# Patient Record
Sex: MALE | Race: BLACK OR AFRICAN AMERICAN | NOT HISPANIC OR LATINO | Employment: FULL TIME | URBAN - METROPOLITAN AREA
[De-identification: names, ages, dates, MRNs, and addresses within clinical notes are randomized per-mention and may not be internally consistent; named-entity substitution may affect disease eponyms.]

---

## 2022-07-24 ENCOUNTER — HOSPITAL ENCOUNTER (EMERGENCY)
Facility: HOSPITAL | Age: 37
Discharge: HOME | End: 2022-07-24
Attending: EMERGENCY MEDICINE | Admitting: EMERGENCY MEDICINE

## 2022-07-24 ENCOUNTER — APPOINTMENT (OUTPATIENT)
Dept: GENERAL RADIOLOGY | Facility: HOSPITAL | Age: 37
End: 2022-07-24

## 2022-07-24 VITALS
WEIGHT: 195.55 LBS | BODY MASS INDEX: 28.96 KG/M2 | RESPIRATION RATE: 18 BRPM | HEART RATE: 57 BPM | OXYGEN SATURATION: 100 % | DIASTOLIC BLOOD PRESSURE: 73 MMHG | HEIGHT: 69 IN | SYSTOLIC BLOOD PRESSURE: 144 MMHG | TEMPERATURE: 98.4 F

## 2022-07-24 DIAGNOSIS — S67.22XA CRUSH INJURY OF HAND, LEFT, INITIAL ENCOUNTER: Primary | ICD-10-CM

## 2022-07-24 PROCEDURE — 99283 EMERGENCY DEPT VISIT LOW MDM: CPT

## 2022-07-24 PROCEDURE — 73130 X-RAY EXAM OF HAND: CPT

## 2022-07-24 RX ORDER — ACETAMINOPHEN 325 MG/1
975 TABLET ORAL ONCE
Status: COMPLETED | OUTPATIENT
Start: 2022-07-24 | End: 2022-07-24

## 2022-07-24 RX ORDER — IBUPROFEN 600 MG/1
600 TABLET ORAL ONCE
Status: COMPLETED | OUTPATIENT
Start: 2022-07-24 | End: 2022-07-24

## 2022-07-24 RX ORDER — IBUPROFEN 800 MG/1
800 TABLET ORAL EVERY 6 HOURS PRN
Qty: 30 TABLET | Refills: 0 | Status: SHIPPED | OUTPATIENT
Start: 2022-07-24

## 2022-07-24 RX ADMIN — ACETAMINOPHEN 975 MG: 325 TABLET ORAL at 01:23

## 2022-07-24 RX ADMIN — IBUPROFEN 600 MG: 600 TABLET, FILM COATED ORAL at 01:23

## 2022-07-24 NOTE — DISCHARGE INSTRUCTIONS
Rest, ice, and elevate.  Wear the Ace wrap for comfort and swelling.  Use the sling for elevation.  Take your meds as prescribed.  You may also take over-the-counter acetaminophen with your medications as needed for pain.  Call work well Monday morning to follow-up with them for further evaluation and treatment.  Return to the emergency department for any acutely worsening swelling, development of redness or any inability to flex or extend your fingers or hand, or any new or worse concerns.

## 2022-07-24 NOTE — ED PROVIDER NOTES
Subjective   The patient presents to the emergency department complaining of left hand pain and swelling.  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  He states that he is right-hand dominant.  He has no open wounds but does have diffuse swelling over the hand part of his left hand.  He is able to flex and extend all of his fingers and touch his thumb with all of his fingers and has full flexion.  He denies any wrist pain or finger pain.  He is able to flex and extend his wrist without any difficulties.  He is neurovascular intact.          Review of Systems   Constitutional: Negative for chills and fever.   HENT: Negative for congestion, ear pain and sore throat.    Eyes: Negative for pain.   Respiratory: Negative for cough, chest tightness and shortness of breath.    Cardiovascular: Negative for chest pain.   Gastrointestinal: Negative for abdominal pain, diarrhea, nausea and vomiting.   Genitourinary: Negative for flank pain and hematuria.   Musculoskeletal: Negative for joint swelling, neck pain and neck stiffness.        LEFT HAND PAIN/SWELLING   Skin: Negative for pallor and rash.   Neurological: Negative for seizures and headaches.   All other systems reviewed and are negative.      History reviewed. No pertinent past medical history.    No Known Allergies    History reviewed. No pertinent surgical history.    History reviewed. No pertinent family history.    Social History     Socioeconomic History   • Marital status:    Tobacco Use   • Smoking status: Never Smoker   Substance and Sexual Activity   • Alcohol use: Never           Objective   Physical Exam  Vitals and nursing note reviewed.   Constitutional:       General: He is not in acute distress.     Appearance: Normal appearance. He is not ill-appearing or toxic-appearing.   HENT:      Head: Normocephalic and atraumatic.   Eyes:      General: No scleral  icterus.  Cardiovascular:      Rate and Rhythm: Normal rate and regular rhythm.      Pulses: Normal pulses.   Pulmonary:      Effort: Pulmonary effort is normal. No respiratory distress.   Abdominal:      General: Abdomen is flat.   Musculoskeletal:         General: Swelling, tenderness and signs of injury present. No deformity. Normal range of motion.      Cervical back: Normal range of motion and neck supple.   Skin:     General: Skin is warm and dry.      Capillary Refill: Capillary refill takes less than 2 seconds.   Neurological:      General: No focal deficit present.      Mental Status: He is alert and oriented to person, place, and time. Mental status is at baseline.   Psychiatric:         Mood and Affect: Mood normal.         Behavior: Behavior normal.         Procedures           ED Course                                           MDM  Number of Diagnoses or Management Options  Crush injury of hand, left, initial encounter: new and requires workup     Amount and/or Complexity of Data Reviewed  Tests in the radiology section of CPT®: reviewed    Risk of Complications, Morbidity, and/or Mortality  Presenting problems: low  Diagnostic procedures: low  Management options: low    Patient Progress  Patient progress: stable      Final diagnoses:   Crush injury of hand, left, initial encounter       ED Disposition  ED Disposition     ED Disposition   Discharge    Condition   Stable    Comment   --             [facility] MD [address] [address] [phone]  Call   MONDAY, FOR FOLLOW UP         Medication List      New Prescriptions    ibuprofen 800 MG tablet  Commonly known as: ADVIL,MOTRIN  Take 1 tablet by mouth Every 6 (Six) Hours As Needed for Mild Pain  or Moderate Pain .           Where to Get Your Medications      These medications were sent to [id number] - [place], [place] - [address] AT [facility] - [phone]  -  [phone] FX  [address] [place] [address]    Hours: 24-hours Phone: [phone]   · ibuprofen 800 MG tablet          Rita Moreno, ELOY  07/24/22 [id number]

## 2024-05-28 ENCOUNTER — TRANSCRIBE ORDERS (OUTPATIENT)
Dept: PHYSICAL THERAPY | Facility: CLINIC | Age: 39
End: 2024-05-28
Payer: COMMERCIAL

## 2024-05-28 DIAGNOSIS — S46.911A STRAIN OF RIGHT SHOULDER, INITIAL ENCOUNTER: Primary | ICD-10-CM

## 2024-05-30 ENCOUNTER — TREATMENT (OUTPATIENT)
Dept: PHYSICAL THERAPY | Facility: CLINIC | Age: 39
End: 2024-05-30
Payer: OTHER MISCELLANEOUS

## 2024-05-30 DIAGNOSIS — S46.211D SPRAIN, BICEP, RIGHT, SUBSEQUENT ENCOUNTER: ICD-10-CM

## 2024-05-30 DIAGNOSIS — M25.511 RIGHT SHOULDER PAIN, UNSPECIFIED CHRONICITY: ICD-10-CM

## 2024-05-30 DIAGNOSIS — M25.811 IMPINGEMENT OF RIGHT SHOULDER: Primary | ICD-10-CM

## 2024-05-30 NOTE — PROGRESS NOTES
Physical Therapy Initial Evaluation and Plan of Care                    [facility] [address] [place] [address]    Patient: Cade Brock   : 1985  Diagnosis/ICD-10 Code:  Impingement of right shoulder [M25.811]  Referring practitioner: Carito Rodriguez, *  Date of Initial Visit: 2024  Today's Date: 2024  Patient seen for 1 sessions           Subjective Questionnaire: QuickDASH: 30 (43.18% disability)      Subjective Evaluation    History of Present Illness  Mechanism of injury: Pt reports to therapy with R shoulder pain. He hurt his shoulder in february. 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 Some of these carts can weight up to 1000 pounds. One of the carts was on an incline and started rolling towards him. He reached out to stop the cart from hitting him with his R arm. He felt pain immediately in the R shoulder. Pt reports that he did not really hear or feel a pop, but it was so sudden that he is unsure about that.     Xray of the R shoulder on 24 was unremarkable.     He is unable to sleep on his R side. If he is pushing heavy items, he feels pain in the R shoulder. He feels like he still has strength in his arm. It hurts when he puts pressure on it. If he tries to sleep on his right side, it can wake him up. It feels better if he sleeps on his stomach and lets his R arm hang off the bed. Pt denies any clicking in his shoulder. Pt denies numbness or tingling in the shoulder. Pt is having difficulty reaching out to the side and reaching behind his back because of discomfort. He feels that his ROM in these directions is limited due to pain, not tightness.     When pt performs crossed arm adduction- he feels pain in the top of his shoulder.     Pt tried to ice it at the beginning. He has his wife try to massage it.     Pt reports that his doctor recommended that he does not lift more  "than 25# and that he does not reach overhead. However, his job does not really have a form of \"light duty,\" so he is just staying home right now.     Goals: get his R shoulder back to full function, return to work    PMH: R elbow fx    Pain  Current pain ratin  At best pain ratin  At worst pain ratin  Quality: discomfort, dull ache and sharp  Relieving factors: change in position  Aggravating factors: movement, lifting, overhead activity, prolonged positioning, sleeping and repetitive movement    Hand dominance: right    Treatments  Previous treatment: physical therapy  Patient Goals  Patient goals for therapy: decreased pain, increased motion, return to work, return to sport/leisure activities, increased strength and independence with ADLs/IADLs        Objective          Palpation     Right Tenderness of the posterior deltoid, supraspinatus and upper trapezius.     Tenderness     Right Shoulder  Tenderness in the AC joint, biceps tendon (proximal) and bicipital groove.     Active Range of Motion   Left Shoulder   Flexion: WFL  Abduction: WFL  External rotation 90°: WFL  Internal rotation 90°: WFL    Right Shoulder   Flexion: WFL  Abduction: 140 degrees with pain  External rotation 90°: WFL and with pain  Internal rotation 90°: WFL and with pain    Additional Active Range of Motion Details  Shoulder IR functional AROM  L shoulder: middle of scapulae   R shoulder: low back       Strength/Myotome Testing     Left Shoulder     Planes of Motion   Flexion: 5   Abduction: 5   External rotation at 0°: 5   Internal rotation at 0°: 5     Right Shoulder     Planes of Motion   Flexion: 4+ (pain)   Abduction: 4+ (pain)   External rotation at 0°: 4+   Internal rotation at 0°: 4+ (pain)     Tests     Right Shoulder   Positive AC shear, Hawkin's, Neer's and passive horizontal adduction.       See Exercise, Manual, and Modality Logs for complete treatment.     Assessment & Plan       Assessment  Impairments: abnormal " muscle firing, abnormal muscle tone, abnormal or restricted ROM, activity intolerance, impaired physical strength, lacks appropriate home exercise program, pain with function and safety issue   Functional limitations: carrying objects, lifting, pulling, pushing, reaching behind back, reaching overhead and unable to perform repetitive tasks   Assessment details: Pt presents to therapy s/p injury of the R shoulder that occurred in February. Results of objective testing and subjective information are consistent with R shoulder impingement / biceps tendon strain. Pt has associated shoulder weakness, decreased AROM and other functional deficits (QuickDASH). Skilled therapy is required in order to address the aforementioned impairments so that the pt can return to his job, which requires extensive lifting and reaching.   Prognosis: good    Goals  Plan Goals: SHOULDER  PROBLEMS:     1. The patient has limited ROM of the R shoulder.    LTG 1: 12 weeks:  The patient will demonstrate 180 degrees of R shoulder flexion and functional internal rotation to approx T6, in order to allow the patient to reach into upper kitchen cabinets and manipulate clothing behind the back with greater ease.    STATUS:  New   STG 1a: 6 weeks:  The patient will demonstrate 160 degrees of R shoulder flexion and functional internal rotation to approx T10, in order to allow the patient to reach into upper kitchen cabinets and manipulate clothing behind the back with greater ease.    STATUS:  New    2. The patient has limited strength of the R shoulder.   LTG 2: 12 weeks:  The patient will demonstrate 5 /5 strength for R shoulder flexion, abduction, external rotation, and internal rotation in order to demonstrate improved shoulder stability.    STATUS:  New   STG2a:  6 weeks:  The patient will be independent with home exercises.     STATUS:  New     3. The patient complains of pain to the R shoulder with pressure   LTG 3: 12 weeks:  The patient will  report a pain rating of 1 /10 or better, on average in the past week when lying on his right side, in order to improve sleep quality and tolerance to performance of activities of daily living.    STATUS:  New   STG 3a: 6 weeks:  The patient will report a pain rating of 4 /10 or better, on average in the past week when lying on his right side, in order to improve sleep quality and tolerance to performance of activities of daily living.    STATUS:  New    4. Carrying, Moving, and Handling Objects Functional Limitation     LTG 4: 12 weeks:  The patient will demonstrate 10 % limitation or less on the QuickDASH.    STATUS:  New   STG 4a: 6 weeks:  The patient will demonstrate 30% limitation or less on the QuickDASH.      STATUS:  New     Plan  Therapy options: will be seen for skilled therapy services  Planned modality interventions: cryotherapy, electrical stimulation/Russian stimulation and TENS  Planned therapy interventions: ADL retraining, soft tissue mobilization, strengthening, stretching, therapeutic activities, joint mobilization, home exercise program, functional ROM exercises, flexibility, body mechanics training, postural training, neuromuscular re-education, manual therapy and motor coordination training  Frequency: 3x week  Duration in weeks: 12  Treatment plan discussed with: patient        Visit Diagnoses:    ICD-10-CM ICD-9-CM   1. Impingement of right shoulder  M25.811 719.81   2. Right shoulder pain, unspecified chronicity  M25.511 719.41   3. Sprain, bicep, right, subsequent encounter  S46.211D V58.89     840.8       History # of Personal Factors and/or Comorbidities: LOW (0)  Examination of Body System(s): # of elements: LOW (1-2)  Clinical Presentation: STABLE   Clinical Decision Making: LOW       Timed:         Manual Therapy:    0     mins  90932;     Therapeutic Exercise:    0     mins  99492;     Neuromuscular Raphael:    0    mins  30165;    Therapeutic Activity:     15     mins  04464;     Gait  Trainin     mins  57572;     Ultrasound:     0     mins  42544;    Ionto                               0    mins   10274  Self Care                       0     mins   37608  Canalith Repos    0     mins 42732      Un-Timed:  Electrical Stimulation:    0     mins  25825 ( );  Dry Needling     0     mins self-pay  Traction     0     mins 04923  Low Eval     30     Mins  01276  Mod Eval     0     Mins  56117  High Eval                       0     Mins  41343  Re-Eval                           0    mins  42496    Timed Treatment:   15   mins   Total Treatment:     45   mins    PT SIGNATURE: Chloe Keith PT    Electronically signed 2024    [place] License: PT - [id number]      Initial Certification  Certification Period: 2024 thru 2024  I certify that the therapy services are furnished while this patient is under my care.  The services outlined above are required by this patient, and will be reviewed every 90 days.     PHYSICIAN: Carito Rodriguez APRN  NPI: [id number]      DATE:     Please sign and return via fax to [phone]. Thank you, [facility].

## 2024-06-05 ENCOUNTER — TREATMENT (OUTPATIENT)
Dept: PHYSICAL THERAPY | Facility: CLINIC | Age: 39
End: 2024-06-05
Payer: OTHER MISCELLANEOUS

## 2024-06-05 DIAGNOSIS — M25.511 RIGHT SHOULDER PAIN, UNSPECIFIED CHRONICITY: ICD-10-CM

## 2024-06-05 DIAGNOSIS — S46.211D SPRAIN, BICEP, RIGHT, SUBSEQUENT ENCOUNTER: ICD-10-CM

## 2024-06-05 DIAGNOSIS — M25.811 IMPINGEMENT OF RIGHT SHOULDER: Primary | ICD-10-CM

## 2024-06-05 PROCEDURE — 97530 THERAPEUTIC ACTIVITIES: CPT

## 2024-06-05 PROCEDURE — 97110 THERAPEUTIC EXERCISES: CPT

## 2024-06-05 PROCEDURE — 97140 MANUAL THERAPY 1/> REGIONS: CPT

## 2024-06-05 NOTE — PROGRESS NOTES
Physical Therapy Daily Treatment Note  [facility] [address] [place] [address]    Patient: Cade Brock   : 1985  Referring practitioner: Carito Rodriguez, *  Date of Initial Visit: Type: THERAPY  Noted: 2024  Today's Date: 2024  Patient seen for 2 sessions           Subjective  Cade Brock reports: he is still experiencing the same pain he initially c/o. He commented that he experiences popping the front of R shoulder        Objective   See Exercise, Manual, and Modality Logs for complete treatment.       Assessment/Plan  Reviewed HEP. Today is just the first follow up after IE, with Chip requiring ongoing therapist directed intervention to fully address deficits following R shoulder.    Visit Diagnoses:    ICD-10-CM ICD-9-CM   1. Impingement of right shoulder  M25.811 719.81   2. Right shoulder pain, unspecified chronicity  M25.511 719.41   3. Sprain, bicep, right, subsequent encounter  S46.211D V58.89     840.8       Progress per Plan of Care and Progress strengthening /stabilization /functional activity         Timed:  Manual Therapy:    10     mins  01831;  Therapeutic Exercise:   10      mins  66818;     Neuromuscular Raphael:        mins  83681;    Therapeutic Activity:     10     mins  16715;     Gait Training:           mins  72920;     Ultrasound:          mins  71545;    Electrical Stimulation:         mins  09428 ( );  Aquatics  __   mins   27408    Untimed:  Electrical Stimulation:         mins  03544 ( );  Mechanical Traction:         mins  76534;     Timed Treatment:   30   mins   Total Treatment:     30   mins    Electronically Signed:  Estephanie Cruz PTA  Physical Therapist Assistant    [place] PTA license [id number]

## 2024-06-13 ENCOUNTER — TREATMENT (OUTPATIENT)
Dept: PHYSICAL THERAPY | Facility: CLINIC | Age: 39
End: 2024-06-13
Payer: OTHER MISCELLANEOUS

## 2024-06-13 DIAGNOSIS — M25.811 IMPINGEMENT OF RIGHT SHOULDER: Primary | ICD-10-CM

## 2024-06-13 DIAGNOSIS — M25.511 RIGHT SHOULDER PAIN, UNSPECIFIED CHRONICITY: ICD-10-CM

## 2024-06-13 DIAGNOSIS — S46.211D SPRAIN, BICEP, RIGHT, SUBSEQUENT ENCOUNTER: ICD-10-CM

## 2024-06-13 NOTE — PROGRESS NOTES
"Physical Therapy Daily Treatment Note  [facility] [address] [place] [address]    Patient: Cade Brock   : 1985  Referring practitioner: Cartio Rodriguez, *  Date of Initial Visit: Type: THERAPY  Noted: 2024  Today's Date: 2024  Patient seen for 3 sessions           Subjective  Cade Brock reports: he returned to the doctor at [facility] yesterday. \"He wants me to continue with therapy.\" I get the pain when I put pressure on it or when I do a movement with twisting.\"     Objective   PTA able to feel a pop R shoulder/while palpating bicipital groove during pt performance of shoulder adduction.  Tenderness at R UT, supraspinatus sulcus and biceps.    See Exercise, Manual, and Modality Logs for complete treatment.     Assessment/Plan  Question need for further work up to determine if there is an internal derangement R shoulder. Ongoing care to aid shoulder stability to reduce pain required.     Visit Diagnoses:    ICD-10-CM ICD-9-CM   1. Impingement of right shoulder  M25.811 719.81   2. Right shoulder pain, unspecified chronicity  M25.511 719.41   3. Sprain, bicep, right, subsequent encounter  S46.211D V58.89     840.8       Progress per Plan of Care and Progress strengthening /stabilization /functional activity         Timed:  Manual Therapy:         mins  52272;  Therapeutic Exercise:    8     mins  60987;     Neuromuscular Raphael:   8     mins  38205;    Therapeutic Activity:    15     mins  85957;     Gait Training:           mins  35753;     Ultrasound:          mins  61353;    Electrical Stimulation:         mins  80568 ( );  Aquatics  __   mins   97343    Untimed:  Electrical Stimulation:         mins  90191 ( );  Mechanical Traction:         mins  10050;     Timed Treatment:   31   mins   Total Treatment:    31    mins    Electronically Signed:  Estephanie Cruz PTA  Physical Therapist Assistant    [place] PTA license [id number]            "

## 2024-06-18 ENCOUNTER — TREATMENT (OUTPATIENT)
Dept: PHYSICAL THERAPY | Facility: CLINIC | Age: 39
End: 2024-06-18
Payer: OTHER MISCELLANEOUS

## 2024-06-18 DIAGNOSIS — M25.811 IMPINGEMENT OF RIGHT SHOULDER: Primary | ICD-10-CM

## 2024-06-18 DIAGNOSIS — M25.511 RIGHT SHOULDER PAIN, UNSPECIFIED CHRONICITY: ICD-10-CM

## 2024-06-18 DIAGNOSIS — S46.211D SPRAIN, BICEP, RIGHT, SUBSEQUENT ENCOUNTER: ICD-10-CM

## 2024-06-18 NOTE — PROGRESS NOTES
"Physical Therapy Daily Treatment Note  [place] [facility] [address] [place] [address]    Patient: Cdae Brock   : 1985  Referring practitioner: Carito Rodriguez, *  Date of Initial Visit: Type: THERAPY  Noted: 2024  Today's Date: 2024  Patient seen for 4 sessions           Subjective  Cade Brock reports: no pain at rest. Chip reported he had another pop from his R shoulder. \"They have me appt to see Orthopaedic physician tomorrow.\"    Chip commented he felt fine when leaving session today.    Objective   See Exercise, Manual, and Modality Logs for complete treatment.     Assessment/Plan  Tenderness remains R supraspinatus sulcus, AC, biceps tendon with palpation. Consult with ortho tomorrow. Did change some of his exercises today due to c/o shoulder popping.    Visit Diagnoses:    ICD-10-CM ICD-9-CM   1. Impingement of right shoulder  M25.811 719.81   2. Right shoulder pain, unspecified chronicity  M25.511 719.41   3. Sprain, bicep, right, subsequent encounter  S46.211D V58.89     840.8       Progress per Plan of Care and Progress strengthening /stabilization /functional activity         Timed:  Manual Therapy:         mins  87280;  Therapeutic Exercise:    10     mins  51460;     Neuromuscular Raphael:    10    mins  05477;    Therapeutic Activity:     10     mins  13594;     Gait Training:           mins  28343;     Ultrasound:          mins  48266;    Electrical Stimulation:         mins  71760 ( );  Aquatics  __   mins   27185    Untimed:  Electrical Stimulation:         mins  41141 ( );  Mechanical Traction:         mins  18966;     Timed Treatment:   30   mins   Total Treatment:     30   mins    Electronically Signed:  Estephanie Cruz PTA  Physical Therapist Assistant    [place] PTA license [id number]            "

## 2024-06-19 ENCOUNTER — OFFICE VISIT (OUTPATIENT)
Dept: ORTHOPEDIC SURGERY | Facility: CLINIC | Age: 39
End: 2024-06-19
Payer: OTHER MISCELLANEOUS

## 2024-06-19 VITALS
WEIGHT: 199 LBS | DIASTOLIC BLOOD PRESSURE: 79 MMHG | SYSTOLIC BLOOD PRESSURE: 145 MMHG | OXYGEN SATURATION: 98 % | HEART RATE: 51 BPM | HEIGHT: 69 IN | BODY MASS INDEX: 29.47 KG/M2

## 2024-06-19 DIAGNOSIS — S49.91XA INJURY OF RIGHT SHOULDER, INITIAL ENCOUNTER: Primary | ICD-10-CM

## 2024-06-19 NOTE — PROGRESS NOTES
"Chief Complaint  Pain and Initial Evaluation of the Right Shoulder    Subjective          Cade Brock presents to [facility] for an evaluation of his right shoulder.     History of Present Illness    The patient presents here today for an evaluation  of his right shoulder. He is a  and he was unloading his truck back in February when he felt a pull to his shoulder. He went to [facility] and had x-rays taken. He reports no prior surgery to his shoulder. This is a work comp injury. He has had a prior dislocation to his shoulder when he was a teenager.     No Known Allergies     Social History     Socioeconomic History    Marital status:    Tobacco Use    Smoking status: Never    Smokeless tobacco: Never   Vaping Use    Vaping status: Never Used   Substance and Sexual Activity    Alcohol use: Never        I reviewed the patient's chief complaint, history of present illness, review of systems, past medical history, surgical history, family history, social history, medications, and allergy list.     REVIEW OF SYSTEMS    Constitutional: Denies fevers, chills, weight loss  Cardiovascular: Denies chest pain, shortness of breath  Skin: Denies rashes, acute skin changes  Neurologic: Denies headache, loss of consciousness  MSK: Right shoulder pain       Objective   Vital Signs:   /79   Pulse 51   Ht 175.3 cm (69\")   Wt 90.3 kg (199 lb)   SpO2 98%   BMI 29.39 kg/m²     Body mass index is 29.39 kg/m².    Physical Exam    General: Alert. No acute distress.   Right upper extremity: non tender to the biceps, tender over the subacromial interval, forward elevation  to 160 degrees, external rotation  to 60 degrees, internal rotation to waistline, 3+/5 supraspinatus strength  with pain, 5/5 infraspinatus and 5/5 subscap, positive impingement, pain with speed's, pain with Hartshorn's, mild pain with dynamic shear testing, neurovascularly intact, sensation intact to the " medial, radial and ulnar nerve.       Procedures    Imaging Results (Most Recent)       None                     Assessment and Plan        XR Shoulder 2+ View Right    Result Date: 5/26/2024  Narrative: XR SHOULDER 2+ VW RIGHT-  Date of Exam: 5/26/2024 9:59 AM  Indication: Right shoulder pain, work-related injury on February 27; M25.511-Pain in right shoulder; M75.51-Bursitis of right shoulder  Comparison: None available.  Findings: No acute fracture or dislocation is noted. AC joint and glenohumeral joint appear unremarkable. Limited imaging the chest is unremarkable. Soft tissues appear unremarkable.      Impression: Impression: Negative study   Electronically Signed By-Jagdish Retana On:5/26/2024 10:29 AM        Diagnoses and all orders for this visit:    1. Injury of right shoulder, initial encounter (Primary)  -     MRI Shoulder Right Without Contrast; Future        The patient presents here today for an evaluation of his right shoulder.     MRI order placed today to evaluate for internal derangement.     He will continue over the counter medications for pain control.     Work note provided for the patient today.     Call or return if worsening symptoms.    Scribed for Kenney Brandt MD by Dominique Abrams  06/19/2024   08:57 EDT         Follow Up       After MRI     Patient was given instructions and counseling regarding his condition or for health maintenance advice. Please see specific information pulled into the AVS if appropriate.       I have personally performed the services described in this document as scribed by the above individual and it is both accurate and complete. Kenney Brandt MD 06/19/24 09:13 EDT

## 2024-06-21 ENCOUNTER — TREATMENT (OUTPATIENT)
Dept: PHYSICAL THERAPY | Facility: CLINIC | Age: 39
End: 2024-06-21
Payer: OTHER MISCELLANEOUS

## 2024-06-21 DIAGNOSIS — S46.211D SPRAIN, BICEP, RIGHT, SUBSEQUENT ENCOUNTER: ICD-10-CM

## 2024-06-21 DIAGNOSIS — M25.511 RIGHT SHOULDER PAIN, UNSPECIFIED CHRONICITY: ICD-10-CM

## 2024-06-21 DIAGNOSIS — M25.811 IMPINGEMENT OF RIGHT SHOULDER: Primary | ICD-10-CM

## 2024-06-21 NOTE — PROGRESS NOTES
Physical Therapy Daily Treatment Note                      [facility] [address] [facility] [place] [address]    Patient: Cade Brock   : 1985  Diagnosis/ICD-10 Code:  Impingement of right shoulder [M25.811]  Referring practitioner: Carito Rodriguez, *  Date of Initial Visit: Type: THERAPY  Noted: 2024  Today's Date: 2024  Patient seen for 5 sessions           Subjective   The patient reported that his shoulder is doing okay. He saw the orthopedic surgeon the other day, and they sent in an order for an MRI- currently waiting on approval for this.     Objective   See Exercise, Manual, and Modality Logs for complete treatment.     Assessment/Plan  Pt tolerated today's session well. Pt did have some pain during the return portion of the lat pull down, likely because this position was causing a little bit of impingement. PT thinks that it is a good idea that pt receives and MRI because there could be an impairment of the transverse humeral ligament,  or pt could have a labral tear. Skilled therapy still required in order to continue improving R shoulder strength and pain so that he can return to his PLOF and tolerance to work. Continue POC.        Timed:  Manual Therapy:    0     mins  73754;  Therapeutic Exercise:    23     mins  21906;     Neuromuscular Raphael:   0    mins  90461;    Therapeutic Activity:     8     mins  61899;     Gait Trainin     mins  46458;     Aquatics                         0      mins  03907    Un-timed:  Mechanical Traction      0     mins  00678  Dry Needling     0     mins self-pay  Electrical Stimulation:    0     mins  65140 ( );      Timed Treatment:   31   mins   Total Treatment:     31   mins    Chloe Keith PT    Electronically signed 2024    [place] License: PT - [id number]

## 2024-06-25 ENCOUNTER — TREATMENT (OUTPATIENT)
Dept: PHYSICAL THERAPY | Facility: CLINIC | Age: 39
End: 2024-06-25
Payer: OTHER MISCELLANEOUS

## 2024-06-25 DIAGNOSIS — M25.511 RIGHT SHOULDER PAIN, UNSPECIFIED CHRONICITY: ICD-10-CM

## 2024-06-25 DIAGNOSIS — S46.211D SPRAIN, BICEP, RIGHT, SUBSEQUENT ENCOUNTER: ICD-10-CM

## 2024-06-25 DIAGNOSIS — M25.811 IMPINGEMENT OF RIGHT SHOULDER: Primary | ICD-10-CM

## 2024-06-25 NOTE — PROGRESS NOTES
Physical Therapy Daily Treatment Note                      [place] [facility] [address] [place] [address]    Patient: Cade Brock   : 1985  Diagnosis/ICD-10 Code:  Impingement of right shoulder [M25.811]  Referring practitioner: Carito Rodriguez, *  Date of Initial Visit: Type: THERAPY  Noted: 2024  Today's Date: 2024  Patient seen for 6 sessions           Subjective   The patient reported that his shoulder MRI is scheduled for 24. The patient reported fatigue and mild pain at the end of the session. Overhead activities and ER activities usually exacerbate the pain.     Objective   See Exercise, Manual, and Modality Logs for complete treatment.     Assessment/Plan  Pt tolerated today's session well. Pt was able to perform chest press today, which is an improvement compared to last session. However, pt did report that the shoulder popped several times during lower trap lifts off the wall. PT still thinks that it is a good idea that pt receives and MRI because there could be an impairment of the transverse humeral ligament, or pt could have a labral tear. MRI is scheduled for 24. Skilled therapy still required in order to continue improving R shoulder strength and pain so that he can return to his PLOF and tolerance to work. Continue POC.        Timed:  Manual Therapy:    0     mins  31493;  Therapeutic Exercise:    23     mins  45947;     Neuromuscular Raphael:   0    mins  45751;    Therapeutic Activity:     8     mins  53656;     Gait Trainin     mins  24643;     Aquatics                         0      mins  02504    Un-timed:  Mechanical Traction      0     mins  61739  Dry Needling     0     mins self-pay  Electrical Stimulation:    0     mins  09116 ( );      Timed Treatment:   31   mins   Total Treatment:     31   mins    Chloe Keith PT    Electronically signed 2024    [place] License: PT - [id number]

## 2024-06-26 ENCOUNTER — TREATMENT (OUTPATIENT)
Dept: PHYSICAL THERAPY | Facility: CLINIC | Age: 39
End: 2024-06-26
Payer: OTHER MISCELLANEOUS

## 2024-06-26 DIAGNOSIS — S46.211D SPRAIN, BICEP, RIGHT, SUBSEQUENT ENCOUNTER: ICD-10-CM

## 2024-06-26 DIAGNOSIS — M25.811 IMPINGEMENT OF RIGHT SHOULDER: Primary | ICD-10-CM

## 2024-06-26 DIAGNOSIS — M25.511 RIGHT SHOULDER PAIN, UNSPECIFIED CHRONICITY: ICD-10-CM

## 2024-06-26 NOTE — PROGRESS NOTES
Progress Note  [facility] [address] [place] [address]      Patient: C[place] Brock   : 1985  Diagnosis/ICD-10 Code:  Impingement of right shoulder [M25.811]  Referring practitioner: Carito Rodriguez, *  Date of Initial Visit: Type: THERAPY  Noted: 2024  Today's Date: 2024  Patient seen for 7 sessions      Subjective:   Subjective Questionnaire: QuickDASH: 23 (27.27% disability)  Clinical Progress: improved  Home Program Compliance: Yes  Treatment has included: therapeutic exercise, neuromuscular re-education, manual therapy, and therapeutic activity    Subjective     Pt was hurting after his last therapy session- it was more of a burning pain.     Pt reports that he cannot lie on the R shoulder for more than 10 seconds when he tries to sleep at night. He had his R arm on the armrest while he was driving here today. It started to hurt so he had to let it down. Pain with pressure to the R shoulder on average in the past week has been 6-7/10. Shoulder is also still painful with anything overhead.     MRI on the R shoulder is scheduled for the .     Objective     Active Range of Motion      Right Shoulder   Flexion: WFL  Abduction: 142 degrees with pain     Additional Active Range of Motion Details  Shoulder IR functional AROM  L shoulder: middle of scapulae   R shoulder: T7        Strength/Myotome Testing      Right Shoulder      Planes of Motion   Flexion: 5  Abduction: 5  External rotation at 0°: 5  Internal rotation at 0°: 5     See Exercise, Manual, and Modality Logs for complete treatment.      Assessment/Plan    Pt tolerated today's session well. Today was reassessment day. Pt has seen some progress in pain and function since starting physical therapy approx 30 days ago. Pt's QuickDASH score decreased by more than 10% since his initial evaluation, indicating significant improvements in perceived function of the R UE. Pt's R shoulder strength has improved to  WNL since his initial evaluation, and MMT did not give him as much pain as it did his first day. Pt's Flexion and IR AROM have also improved. However, his functional IR AROM is still not equal to his L shoulder and still gives him pain. Despite listed improvements, pt is still heavily struggling with overhead activities and with sleeping. He still gets significant pain when pressure is applied to the R shoulder, especially when he tries to lie on it at night. PT recommends MRI of the R shoulder in order to ensure that there is no internal derangement of transverse humeral ligament or labrum. Skilled therapy still required in order to continue to address impairments and to improve function of R shoulder so that pt can return to work. Continue POC.        Goals  Plan Goals: SHOULDER  PROBLEMS:      1. The patient has limited ROM of the R shoulder.                  LTG 1: 12 weeks:  The patient will demonstrate 180 degrees of R shoulder flexion and functional internal rotation to approx T6, in order to allow the patient to reach into upper kitchen cabinets and manipulate clothing behind the back with greater ease.                          STATUS: progressing              STG 1a: 6 weeks:  The patient will demonstrate 160 degrees of R shoulder flexion and functional internal rotation to approx T10, in order to allow the patient to reach into upper kitchen cabinets and manipulate clothing behind the back with greater ease.                          STATUS:  MET     2. The patient has limited strength of the R shoulder.              LTG 2: 12 weeks:  The patient will demonstrate 5 /5 strength for R shoulder flexion, abduction, external rotation, and internal rotation in order to demonstrate improved shoulder stability.                          STATUS:  MET              STG2a:  6 weeks:  The patient will be independent with home exercises.                           STATUS:  MET                3. The patient complains of  pain to the R shoulder with pressure              LTG 3: 12 weeks:  The patient will report a pain rating of 1 /10 or better, on average in the past week when lying on his right side, in order to improve sleep quality and tolerance to performance of activities of daily living.                          STATUS:  progressing              STG 3a: 6 weeks:  The patient will report a pain rating of 4 /10 or better, on average in the past week when lying on his right side, in order to improve sleep quality and tolerance to performance of activities of daily living.                          STATUS:  progressing     4. Carrying, Moving, and Handling Objects Functional Limitation                               LTG 4: 12 weeks:  The patient will demonstrate 10 % limitation or less on the QuickDASH.                          STATUS:  progressing              STG 4a: 6 weeks:  The patient will demonstrate 30% limitation or less on the QuickDASH.                            STATUS:  MET    Progress toward previous goals: Partially Met      Recommendations: Continue as planned  Timeframe: 2 months  Prognosis to achieve goals: good    Signature: [place] PT    Electronically signed 2024    [place] License: PT - [id number]      Timed:  Manual Therapy:    0     mins  33545;  Therapeutic Exercise:    8     mins  64245;     Neuromuscular Raphael:    0    mins  10334;    Therapeutic Activity:     23     mins  04660;     Gait Trainin     mins  67152;     Aquatics                         0      mins  14675    Un-timed:  Mechanical Traction      0     mins  02904  Dry Needling     0     mins self-pay  Electrical Stimulation:    0     mins  71854 ( );    Timed Treatment:   31   mins   Total Treatment:     31   mins

## 2024-07-02 ENCOUNTER — TREATMENT (OUTPATIENT)
Dept: PHYSICAL THERAPY | Facility: CLINIC | Age: 39
End: 2024-07-02
Payer: OTHER MISCELLANEOUS

## 2024-07-02 DIAGNOSIS — M25.811 IMPINGEMENT OF RIGHT SHOULDER: Primary | ICD-10-CM

## 2024-07-02 DIAGNOSIS — S46.211D SPRAIN, BICEP, RIGHT, SUBSEQUENT ENCOUNTER: ICD-10-CM

## 2024-07-02 DIAGNOSIS — M25.511 RIGHT SHOULDER PAIN, UNSPECIFIED CHRONICITY: ICD-10-CM

## 2024-07-02 NOTE — PROGRESS NOTES
"Physical Therapy Daily Treatment Note  [place] [facility] [address] [place] [address]    Patient: Cade Brock   : 1985  Referring practitioner: Carito Rodriguez, *  Date of Initial Visit: Type: THERAPY  Noted: 2024  Today's Date: 2024  Patient seen for 8 sessions           Subjective  C[place] Brock reports: \"I still have pain.\"     Objective   MRI scheduled for 2024.    See Exercise, Manual, and Modality Logs for complete treatment.     Assessment/Plan  Chip commented that he had a good workout today. Advanced repetitions with all. Anticipate ongoing strengthening to aid pain control.     Visit Diagnoses:    ICD-10-CM ICD-9-CM   1. Impingement of right shoulder  M25.811 719.81   2. Right shoulder pain, unspecified chronicity  M25.511 719.41   3. Sprain, bicep, right, subsequent encounter  S46.211D V58.89     840.8       Progress per Plan of Care and Progress strengthening /stabilization /functional activity         Timed:  Manual Therapy:         mins  46495;  Therapeutic Exercise:    10     mins  82547;     Neuromuscular Raphael:    10    mins  59957;    Therapeutic Activity:     10     mins  87896;     Gait Training:           mins  45246;     Ultrasound:          mins  23654;    Electrical Stimulation:         mins  38832 ( );  Aquatics  __   mins   03961    Untimed:  Electrical Stimulation:         mins  01909 ( );  Mechanical Traction:         mins  97967;     Timed Treatment:   30   mins   Total Treatment:     30   mins    Electronically Signed:  Estephanie Cruz PTA  Physical Therapist Assistant    [place] PTA license [id number]            "

## 2024-07-05 ENCOUNTER — TREATMENT (OUTPATIENT)
Dept: PHYSICAL THERAPY | Facility: CLINIC | Age: 39
End: 2024-07-05
Payer: OTHER MISCELLANEOUS

## 2024-07-05 DIAGNOSIS — M25.511 RIGHT SHOULDER PAIN, UNSPECIFIED CHRONICITY: ICD-10-CM

## 2024-07-05 DIAGNOSIS — M25.811 IMPINGEMENT OF RIGHT SHOULDER: Primary | ICD-10-CM

## 2024-07-05 DIAGNOSIS — S46.211D SPRAIN, BICEP, RIGHT, SUBSEQUENT ENCOUNTER: ICD-10-CM

## 2024-07-05 NOTE — PROGRESS NOTES
Physical Therapy Daily Treatment Note                      [facility] [address] [place] [address]    Patient: Cade Brock   : 1985  Diagnosis/ICD-10 Code:  Impingement of right shoulder [M25.811]  Referring practitioner: Carito Rodriguez, *  Date of Initial Visit: Type: THERAPY  Noted: 2024  Today's Date: 2024  Patient seen for 9 sessions           Subjective   The patient reported that he feels like his shoulder is getting better because he is able to lie on the R side a little longer before having to switch sides at night.     Objective   See Exercise, Manual, and Modality Logs for complete treatment.     Assessment/Plan  Pt tolerated today's session well. Pt did report a burning sensation in the shoulder during today's session. PT still thinks that it is a good idea that pt receives and MRI because there could be an impairment of the transverse humeral ligament, or pt could have a labral tear. MRI is scheduled for 24. Skilled therapy still required in order to continue improving R shoulder strength and pain so that he can return to his PLOF and tolerance to work. Continue POC.        Timed:  Manual Therapy:    0     mins  19920;  Therapeutic Exercise:    23     mins  11098;     Neuromuscular Raphael:   0    mins  41532;    Therapeutic Activity:     8     mins  03359;     Gait Trainin     mins  53318;     Aquatics                         0      mins  90164    Un-timed:  Mechanical Traction      0     mins  41132  Dry Needling     0     mins self-pay  Electrical Stimulation:    0     mins  80209 ( );      Timed Treatment:   31   mins   Total Treatment:     31   mins    Chloe Keith PT    Electronically signed 2024    [place] License: PT - [id number]

## 2024-07-09 ENCOUNTER — TREATMENT (OUTPATIENT)
Dept: PHYSICAL THERAPY | Facility: CLINIC | Age: 39
End: 2024-07-09
Payer: OTHER MISCELLANEOUS

## 2024-07-09 DIAGNOSIS — S46.211D SPRAIN, BICEP, RIGHT, SUBSEQUENT ENCOUNTER: ICD-10-CM

## 2024-07-09 DIAGNOSIS — M25.811 IMPINGEMENT OF RIGHT SHOULDER: Primary | ICD-10-CM

## 2024-07-09 DIAGNOSIS — M25.511 RIGHT SHOULDER PAIN, UNSPECIFIED CHRONICITY: ICD-10-CM

## 2024-07-09 NOTE — PROGRESS NOTES
Physical Therapy Daily Treatment Note                      [facility] [address] [place] [address]    Patient: Cade Brock   : 1985  Diagnosis/ICD-10 Code:  Impingement of right shoulder [M25.811]  Referring practitioner: Carito Rodriguez, *  Date of Initial Visit: Type: THERAPY  Noted: 2024  Today's Date: 2024  Patient seen for 10 sessions           Subjective   The patient reported that his shoulder is doing okay today. He gets his MRI in three days.    Objective   See Exercise, Manual, and Modality Logs for complete treatment.     Assessment/Plan  Pt tolerated today's session well. Pt continues to show fatigue of the shoulder during his therapy session. Pt reports pinching sensation near the supraspinatus insertion during shoulder ER at 90 degrees abduction. Pt experiences pain in the anterior shoulder during forward shoulder flexion. PT still thinks that it is a good idea that pt receives and MRI because there could be an impairment of the transverse humeral ligament, or pt could have a labral tear. MRI is scheduled for 24. Skilled therapy still required in order to continue improving R shoulder strength and pain so that he can return to his PLOF and tolerance to work. Continue POC.        Timed:  Manual Therapy:     0    mins  93035;  Therapeutic Exercise:    23     mins  61522;     Neuromuscular Raphael:   0    mins  32633;    Therapeutic Activity:     8     mins  13652;     Gait Trainin     mins  93699;     Aquatics                         0      mins  12261    Un-timed:  Mechanical Traction      0     mins  84704  Dry Needling     0     mins self-pay  Electrical Stimulation:    0     mins  55372 ( );      Timed Treatment:    31  mins   Total Treatment:     31  mins    Chloe Keith PT    Electronically signed 2024    [place] License: PT - [id number]

## 2024-07-11 ENCOUNTER — TREATMENT (OUTPATIENT)
Dept: PHYSICAL THERAPY | Facility: CLINIC | Age: 39
End: 2024-07-11
Payer: OTHER MISCELLANEOUS

## 2024-07-11 DIAGNOSIS — M25.511 RIGHT SHOULDER PAIN, UNSPECIFIED CHRONICITY: ICD-10-CM

## 2024-07-11 DIAGNOSIS — S46.211D SPRAIN, BICEP, RIGHT, SUBSEQUENT ENCOUNTER: ICD-10-CM

## 2024-07-11 DIAGNOSIS — M25.811 IMPINGEMENT OF RIGHT SHOULDER: Primary | ICD-10-CM

## 2024-07-11 NOTE — PROGRESS NOTES
Physical Therapy Daily Treatment Note                      [place] [facility] [address] [place] [address]    Patient: Cade Brock   : 1985  Diagnosis/ICD-10 Code:  Impingement of right shoulder [M25.811]  Referring practitioner: Carito Rodriguez, *  Date of Initial Visit: Type: THERAPY  Noted: 2024  Today's Date: 2024  Patient seen for 11 sessions           Subjective   The patient reported that his shoulder was sore after last session. He is still struggling to sleep on that side. MRI is tomorrow.    Objective   See Exercise, Manual, and Modality Logs for complete treatment.     Assessment/Plan  Pt tolerated today's session well. Pt's MRI is tomorrow. Skilled therapy still required in order to continue improving R shoulder strength and pain so that he can return to his PLOF and tolerance to work. Continue POC.          Timed:  Manual Therapy:    0     mins  96212;  Therapeutic Exercise:    23     mins  95740;     Neuromuscular Raphael:   0    mins  57778;    Therapeutic Activity:     8     mins  96885;     Gait Trainin     mins  82498;     Aquatics                         0      mins  48943    Un-timed:  Mechanical Traction      0     mins  46588  Dry Needling     0     mins self-pay  Electrical Stimulation:    0     mins  14256 ( );      Timed Treatment:   31   mins   Total Treatment:     31   mins    Chloe Keith PT    Electronically signed 2024    [place] License: PT - [id number]

## 2024-07-12 ENCOUNTER — HOSPITAL ENCOUNTER (OUTPATIENT)
Dept: MRI IMAGING | Facility: HOSPITAL | Age: 39
Discharge: HOME | End: 2024-07-12
Admitting: STUDENT IN AN ORGANIZED HEALTH CARE EDUCATION/TRAINING PROGRAM
Payer: OTHER MISCELLANEOUS

## 2024-07-12 DIAGNOSIS — S49.91XA INJURY OF RIGHT SHOULDER, INITIAL ENCOUNTER: ICD-10-CM

## 2024-07-12 PROCEDURE — 73221 MRI JOINT UPR EXTREM W/O DYE: CPT

## 2024-07-17 ENCOUNTER — TREATMENT (OUTPATIENT)
Dept: PHYSICAL THERAPY | Facility: CLINIC | Age: 39
End: 2024-07-17
Payer: OTHER MISCELLANEOUS

## 2024-07-17 DIAGNOSIS — M25.811 IMPINGEMENT OF RIGHT SHOULDER: Primary | ICD-10-CM

## 2024-07-17 DIAGNOSIS — M25.511 RIGHT SHOULDER PAIN, UNSPECIFIED CHRONICITY: ICD-10-CM

## 2024-07-17 DIAGNOSIS — S46.211D SPRAIN, BICEP, RIGHT, SUBSEQUENT ENCOUNTER: ICD-10-CM

## 2024-07-17 NOTE — PROGRESS NOTES
Physical Therapy Daily Treatment Note                      [facility] [address] [facility] [place] [address]    Patient: Cade Brock   : 1985  Diagnosis/ICD-10 Code:  Impingement of right shoulder [M25.811]  Referring practitioner: Carito Rodriguez, *  Date of Initial Visit: Type: THERAPY  Noted: 2024  Today's Date: 2024  Patient seen for 12 sessions           Subjective   The patient reported that he still gets pain in the shoulder if he lies on it too long. End range IR and ER are still difficult.     MRI results are in.  IMPRESSION:  1.Mild arthritis glenohumeral joint with subchondral cyst inferior glenoid and diffuse tearing of the labrum.  2.Os acromiale with associated marrow edema. This could be symptomatic given the associated osseous marrow edema.  3.Mild tendinosis distal supraspinatus and infraspinatus tendons.    Objective   See Exercise, Manual, and Modality Logs for complete treatment.     Assessment/Plan  Today's session was spent going over pt's recent MRI results and formulating a plan for going forward. PT and pt agree that it is best to pause therapy for now and wait to see what Dr. Brandt suggests after pt's orthopedic appointment on 24. Pt will return if suggested by orthopedic surgeon.        Timed:  Manual Therapy:    0     mins  05402;  Therapeutic Exercise:    0     mins  17900;     Neuromuscular Raphael:   0    mins  93063;    Therapeutic Activity:     23     mins  77767;     Gait Trainin     mins  29485;     Aquatics                         0      mins  25990    Un-timed:  Mechanical Traction      0     mins  32635  Dry Needling     0     mins self-pay  Electrical Stimulation:    0     mins  56537 ( );      Timed Treatment:   23   mins   Total Treatment:     23   mins    Chloe Keith PT    Electronically signed 2024    [place] License: PT - [id number]

## 2024-07-26 ENCOUNTER — OFFICE VISIT (OUTPATIENT)
Dept: ORTHOPEDIC SURGERY | Facility: CLINIC | Age: 39
End: 2024-07-26
Payer: OTHER MISCELLANEOUS

## 2024-07-26 VITALS — SYSTOLIC BLOOD PRESSURE: 134 MMHG | OXYGEN SATURATION: 97 % | DIASTOLIC BLOOD PRESSURE: 88 MMHG | HEART RATE: 64 BPM

## 2024-07-26 DIAGNOSIS — S49.91XA INJURY OF RIGHT SHOULDER, INITIAL ENCOUNTER: Primary | ICD-10-CM

## 2024-07-26 DIAGNOSIS — M19.011 ARTHRITIS OF RIGHT SHOULDER REGION: ICD-10-CM

## 2024-07-26 RX ORDER — DICLOFENAC SODIUM 75 MG/1
75 TABLET, DELAYED RELEASE ORAL 2 TIMES DAILY
Qty: 60 TABLET | Refills: 1 | Status: SHIPPED | OUTPATIENT
Start: 2024-07-26 | End: 2024-07-29

## 2024-07-26 NOTE — PROGRESS NOTES
Chief Complaint  Follow-up of the Right Shoulder    Subjective          Cade Brock presents to [facility] for   History of Present Illness    The patient returns for follow-up of his right shoulder.  He is a  and was unloading back in February when he felt a pull to the shoulder.  This is a work comp injury.  He reports a prior shoulder dislocation when he was a teenager.    No Known Allergies     Social History     Socioeconomic History    Marital status:    Tobacco Use    Smoking status: Never    Smokeless tobacco: Never   Vaping Use    Vaping status: Never Used   Substance and Sexual Activity    Alcohol use: Never        I reviewed the patient's chief complaint, history of present illness, review of systems, past medical history, surgical history, family history, social history, medications, and allergy list.     REVIEW OF SYSTEMS    Constitutional: Denies fevers, chills, weight loss  Cardiovascular: Denies chest pain, shortness of breath  Skin: Denies rashes, acute skin changes  Neurologic: Denies headache, loss of consciousness  MSK: Right shoulder pain      Objective   Vital Signs:   /88   Pulse 64   SpO2 97%     There is no height or weight on file to calculate BMI.    Physical Exam    General: Alert. No acute distress.   Right upper extremity: non tender to the biceps, tender over the subacromial interval, forward elevation  to 160 degrees, external rotation  to 60 degrees, internal rotation to waistline, 5 out of 5 rotator cuff testing, positive impingement, pain with speed's, pain with Morris's, mild pain with dynamic shear testing, neurovascularly intact, sensation intact to the medial, radial and ulnar nerve.       Procedures    Imaging Results (Most Recent)       None                     Assessment and Plan        MRI Shoulder Right Without Contrast    Result Date: 7/15/2024  Narrative: MRI SHOULDER RIGHT WO CONTRAST Date of Exam:  7/12/2024 5:47 PM EDT Indication: Shoulder trauma, rotator cuff tear. Work injury. Injury 2/27/2024.  Comparison: Shoulder radiograph 5/26/2024 Technique:  Routine multiplanar/multisequence images of the right shoulder were obtained without contrast administration.  Findings: Rotator cuff: Supraspinatus tendon: Mild distal tendinosis. No significant muscle atrophy. Infraspinatus tendon: Mild distal tendinosis. No significant muscle atrophy. Subscapularis tendon: No tendon abnormality. No significant muscle atrophy. Teres minor tendon: No tendon abnormality. No significant muscle atrophy. Glenohumeral joint: Humeral head is centrally located within the glenoid. Physiologic joint fluid is present. Mild cartilage narrowing. Subchondral cyst at the inferior glenoid. The inferior joint capsule is normal thickness and normal signal intensity. Labrum: Diffuse tearing of the entire labrum. No paralabral cysts. Biceps tendon: Long head of the biceps tendon is appropriately located within the bicipital groove. It has normal signal intensity and morphology. It inserts appropriately on the labrum. Acromioclavicular Joint: There is a meso os acromiale. There is mild edema at the pseudoarticulation. No significant lateral downsloping of the acromion. Bone: No fractures or aggressive osseous lesions. Bone marrow signal intensity is normal. Miscellaneous: No significant subacromial subdeltoid fluid. No pathologically enlarged axillary lymph nodes. Deltoid muscle has normal size and signal intensity.     Impression: 1.Mild arthritis glenohumeral joint with subchondral cyst inferior glenoid and diffuse tearing of the labrum. 2.Os acromiale with associated marrow edema. This could be symptomatic given the associated osseous marrow edema. 3.Mild tendinosis distal supraspinatus and infraspinatus tendons. Electronically Signed: Leda Soni MD  7/15/2024 5:02 PM EDT  Workstation ID: [id number]      Diagnoses and all orders for this  visit:    1. Injury of right shoulder, initial encounter (Primary)  -     diclofenac (VOLTAREN) 75 MG EC tablet; Take 1 tablet by mouth 2 (Two) Times a Day.  Dispense: 60 tablet; Refill: 1    2. Arthritis of right shoulder region        We reviewed his MRI today in the office.  He does have developing shoulder arthritis with subchondral cyst formation in the inferior glenoid and degenerative labral tearing.  He has some marrow edema in his os acromiale which may be causing some of his impingement type pain as well.  We discussed treatment options.  We discussed activity progression.  We discussed avoiding heavy benchpress type activity.  We discussed anti-inflammatory medications to help with his pain.  Voltaren was prescribed today.  We may consider injections in the future if not improving.  I would like to see him back in 8 weeks for reevaluation.  No x-rays needed when he returns.        Call or return if worsening symptoms.    Scribed for Kenney Brandt MD by Candi Moss  07/26/2024   08:18 EDT         Follow Up       8 weeks    Patient was given instructions and counseling regarding his condition or for health maintenance advice. Please see specific information pulled into the AVS if appropriate.       I have personally performed the services described in this document as scribed by the above individual and it is both accurate and complete. Kenney Brandt MD 07/26/24 09:18 EDT

## 2024-07-29 ENCOUNTER — TELEPHONE (OUTPATIENT)
Dept: ORTHOPEDIC SURGERY | Facility: CLINIC | Age: 39
End: 2024-07-29
Payer: OTHER MISCELLANEOUS

## 2024-07-29 DIAGNOSIS — M19.011 ARTHRITIS OF RIGHT SHOULDER REGION: ICD-10-CM

## 2024-07-29 DIAGNOSIS — S49.91XA INJURY OF RIGHT SHOULDER, INITIAL ENCOUNTER: Primary | ICD-10-CM

## 2024-07-29 RX ORDER — MELOXICAM 15 MG/1
15 TABLET ORAL DAILY
Qty: 30 TABLET | Refills: 1 | Status: SHIPPED | OUTPATIENT
Start: 2024-07-29

## 2024-07-29 NOTE — TELEPHONE ENCOUNTER
Caller: Cade Brock    Relationship: Self    Best call back number: [phone]    What was the call regarding: PT WAS PRESCRIBED MEDICATION ON 07/26/24 TO RETURN TO WORK WITH. HOWEVER, THIS MEDICINE CAUSES DROWSINESS. PT IS UNABLE TO TAKE THIS MEDICATION AT WORK DUE TO THE SIDE EFFECTS.    PT WOULD ALSO TO DISCUSS WITH DR HANNAH ABOUT HOW HIS SHOULDER TEAR WILL HEAL WITH HIM RETURNING TO WORK. HE STATES HE IS CONCERNED ABOUT RETURNING TO WORK WITH THIS ISSUE NOT HEALED YET, AS HE WILL BE USING HIS SHOULDER A LOT. PLEASE CONTACT PT TO DISCUSS THESE ISSUES.

## 2024-07-29 NOTE — TELEPHONE ENCOUNTER
PATIENT CAME INTO OFFICE TO  UPDATED WORK NOTE. PATIENT WAS ADVISED TO STOP DICLOFENAC AND A SCRIPT FOR MOBIC WAS SENT TO PHARMACY MAY TAKE AT BEDTIME. PATIENT VERBALIZED UNDERSTANDING.

## 2024-09-23 ENCOUNTER — OFFICE VISIT (OUTPATIENT)
Dept: ORTHOPEDIC SURGERY | Facility: CLINIC | Age: 39
End: 2024-09-23
Payer: OTHER MISCELLANEOUS

## 2024-09-23 VITALS
HEART RATE: 65 BPM | BODY MASS INDEX: 30.96 KG/M2 | SYSTOLIC BLOOD PRESSURE: 145 MMHG | DIASTOLIC BLOOD PRESSURE: 85 MMHG | WEIGHT: 209 LBS | OXYGEN SATURATION: 94 % | HEIGHT: 69 IN

## 2024-09-23 DIAGNOSIS — S43.432D LABRAL TEAR OF SHOULDER, LEFT, SUBSEQUENT ENCOUNTER: ICD-10-CM

## 2024-09-23 DIAGNOSIS — M25.811 OS ACROMIALE OF RIGHT SHOULDER: ICD-10-CM

## 2024-09-23 DIAGNOSIS — M19.011 ARTHRITIS OF RIGHT SHOULDER REGION: Primary | ICD-10-CM

## 2024-09-23 PROCEDURE — 99213 OFFICE O/P EST LOW 20 MIN: CPT | Performed by: STUDENT IN AN ORGANIZED HEALTH CARE EDUCATION/TRAINING PROGRAM

## 2024-10-09 ENCOUNTER — DOCUMENTATION (OUTPATIENT)
Dept: PHYSICAL THERAPY | Facility: CLINIC | Age: 39
End: 2024-10-09
Payer: OTHER MISCELLANEOUS

## 2024-10-09 NOTE — PROGRESS NOTES
Outpatient Physical Therapy  [address] [facility] [place] [address]      Discharge Summary  Discharge Summary from Physical Therapy Report      Dates  PT visit: 5/30/24-7/17/24  Number of Visits: 12       Goals  Plan Goals: SHOULDER  PROBLEMS:      1. The patient has limited ROM of the R shoulder.                  LTG 1: 12 weeks:  The patient will demonstrate 180 degrees of R shoulder flexion and functional internal rotation to approx T6, in order to allow the patient to reach into upper kitchen cabinets and manipulate clothing behind the back with greater ease.                          STATUS: progressing              STG 1a: 6 weeks:  The patient will demonstrate 160 degrees of R shoulder flexion and functional internal rotation to approx T10, in order to allow the patient to reach into upper kitchen cabinets and manipulate clothing behind the back with greater ease.                          STATUS:  MET     2. The patient has limited strength of the R shoulder.              LTG 2: 12 weeks:  The patient will demonstrate 5 /5 strength for R shoulder flexion, abduction, external rotation, and internal rotation in order to demonstrate improved shoulder stability.                          STATUS:  MET              STG2a:  6 weeks:  The patient will be independent with home exercises.                           STATUS:  MET                3. The patient complains of pain to the R shoulder with pressure              LTG 3: 12 weeks:  The patient will report a pain rating of 1 /10 or better, on average in the past week when lying on his right side, in order to improve sleep quality and tolerance to performance of activities of daily living.                          STATUS:  progressing              STG 3a: 6 weeks:  The patient will report a pain rating of 4 /10 or better, on average in the past week when lying on his right side, in order to improve sleep quality and tolerance to performance of activities of  daily living.                          STATUS:  progressing     4. Carrying, Moving, and Handling Objects Functional Limitation                               LTG 4: 12 weeks:  The patient will demonstrate 10 % limitation or less on the Quick[employer].                          STATUS:  progressing              STG 4a: 6 weeks:  The patient will demonstrate 30% limitation or less on the QuickDASH.                            STATUS:  MET    Discharge Plan: Continue with current home exercise program as instructed    Date of Discharge 10/9/2024      Electronically signed:   Kaylin Mccormick PTA  Physical Therapist Assistant  [facility] License #: [id number]